# Patient Record
Sex: MALE | Race: WHITE | Employment: STUDENT | URBAN - NONMETROPOLITAN AREA
[De-identification: names, ages, dates, MRNs, and addresses within clinical notes are randomized per-mention and may not be internally consistent; named-entity substitution may affect disease eponyms.]

---

## 2024-04-16 ENCOUNTER — NURSE ONLY (OUTPATIENT)
Age: 24
End: 2024-04-16

## 2024-04-16 DIAGNOSIS — Z11.1 SCREENING FOR TUBERCULOSIS: Primary | ICD-10-CM

## 2024-04-16 PROCEDURE — 86580 TB INTRADERMAL TEST: CPT | Performed by: NURSE PRACTITIONER

## 2024-04-16 NOTE — PROGRESS NOTES
Patient presents in office today for number 1 TB screening. After obtaining consent from patient 0.1 mL of Tuberculin Purified, Diluted Aplisol was administered into to the left forearm by Mahsa Harding LPN. Patient tolerated well and was advised to return in 48-72 hours for reading. Patient voiced understanding.

## 2024-04-18 ENCOUNTER — OFFICE VISIT (OUTPATIENT)
Age: 24
End: 2024-04-18

## 2024-04-18 VITALS
BODY MASS INDEX: 20.4 KG/M2 | DIASTOLIC BLOOD PRESSURE: 70 MMHG | RESPIRATION RATE: 16 BRPM | HEIGHT: 67 IN | WEIGHT: 130 LBS | OXYGEN SATURATION: 99 % | HEART RATE: 83 BPM | SYSTOLIC BLOOD PRESSURE: 120 MMHG | TEMPERATURE: 97.2 F

## 2024-04-18 DIAGNOSIS — Z00.00 HEALTHY ADULT ON ROUTINE PHYSICAL EXAMINATION: Primary | ICD-10-CM

## 2024-04-18 LAB
INDURATION: 0
TB SKIN TEST: NEGATIVE

## 2024-04-18 PROCEDURE — 99202 OFFICE O/P NEW SF 15 MIN: CPT | Performed by: NURSE PRACTITIONER

## 2024-04-18 SDOH — ECONOMIC STABILITY: FOOD INSECURITY: WITHIN THE PAST 12 MONTHS, THE FOOD YOU BOUGHT JUST DIDN'T LAST AND YOU DIDN'T HAVE MONEY TO GET MORE.: NEVER TRUE

## 2024-04-18 SDOH — ECONOMIC STABILITY: INCOME INSECURITY: HOW HARD IS IT FOR YOU TO PAY FOR THE VERY BASICS LIKE FOOD, HOUSING, MEDICAL CARE, AND HEATING?: NOT HARD AT ALL

## 2024-04-18 SDOH — ECONOMIC STABILITY: HOUSING INSECURITY
IN THE LAST 12 MONTHS, WAS THERE A TIME WHEN YOU DID NOT HAVE A STEADY PLACE TO SLEEP OR SLEPT IN A SHELTER (INCLUDING NOW)?: NO

## 2024-04-18 SDOH — ECONOMIC STABILITY: FOOD INSECURITY: WITHIN THE PAST 12 MONTHS, YOU WORRIED THAT YOUR FOOD WOULD RUN OUT BEFORE YOU GOT MONEY TO BUY MORE.: NEVER TRUE

## 2024-04-18 ASSESSMENT — PATIENT HEALTH QUESTIONNAIRE - PHQ9
SUM OF ALL RESPONSES TO PHQ QUESTIONS 1-9: 0
2. FEELING DOWN, DEPRESSED OR HOPELESS: NOT AT ALL
SUM OF ALL RESPONSES TO PHQ QUESTIONS 1-9: 0
1. LITTLE INTEREST OR PLEASURE IN DOING THINGS: NOT AT ALL
SUM OF ALL RESPONSES TO PHQ QUESTIONS 1-9: 0
SUM OF ALL RESPONSES TO PHQ9 QUESTIONS 1 & 2: 0
SUM OF ALL RESPONSES TO PHQ QUESTIONS 1-9: 0

## 2024-04-18 ASSESSMENT — ENCOUNTER SYMPTOMS
SHORTNESS OF BREATH: 0
DIARRHEA: 0
SORE THROAT: 0
COUGH: 0
WHEEZING: 0
NAUSEA: 0
VOMITING: 0

## 2024-04-18 NOTE — PROGRESS NOTES
[facility] [place] [facility]  [facility] - [place] [address] [facility] [address]  Dept: [phone]  Loc: [phone]     Negro Maciel is a 23 y.o. male who presents today for:  No chief complaint on file.      Assessment/Plan:     1. Healthy adult on routine physical examination  -PE WNL.  F/u with health center PRN.       No results found for any visits on 04/18/24.     Medications Ordered:  No orders of the defined types were placed in this encounter.      No follow-ups on file.    No future appointments.    HPI:   Pt presents for pharmacy physical.  No current complaints.      Subjective:      Review of Systems   Constitutional:  Negative for chills and fever.   HENT:  Negative for congestion and sore throat.    Respiratory:  Negative for cough, shortness of breath and wheezing.    Cardiovascular:  Negative for chest pain and palpitations.   Gastrointestinal:  Negative for diarrhea, nausea and vomiting.   Skin:  Negative for rash.   Neurological:  Negative for dizziness, light-headedness and headaches.         Objective:     Vitals:    04/18/24 1031   BP: 120/70   Pulse: 83   Resp: 16   Temp: 97.2 °F (36.2 °C)   TempSrc: Tympanic   SpO2: 99%   Weight: 59 kg (130 lb)   Height: 1.702 m (5' 7\")       Body mass index is 20.36 kg/m².    Wt Readings from Last 3 Encounters:   04/18/24 59 kg (130 lb)     BP Readings from Last 3 Encounters:   04/18/24 120/70       Physical Exam  Vitals and nursing note reviewed.   Constitutional:       General: He is not in acute distress.     Appearance: Normal appearance. He is well-groomed and normal weight. He is not ill-appearing or toxic-appearing.   HENT:      Head: Normocephalic.      Right Ear: Hearing, tympanic membrane, ear canal and external ear normal.      Left Ear: Hearing, tympanic membrane, ear canal and external ear normal.      Nose: Nose normal.      Mouth/Throat:      Lips: Pink.      Mouth: Mucous membranes are moist.

## 2024-04-22 ENCOUNTER — NURSE ONLY (OUTPATIENT)
Age: 24
End: 2024-04-22

## 2024-04-22 DIAGNOSIS — Z11.1 ENCOUNTER FOR TUBERCULIN SKIN TEST: Primary | ICD-10-CM

## 2024-04-22 PROCEDURE — 86580 TB INTRADERMAL TEST: CPT | Performed by: NURSE PRACTITIONER

## 2024-04-22 NOTE — PROGRESS NOTES
Second step PPD into the right forearm patient tolerated well , patient advised in this visit to return in 48 - 72 hours for the reading.

## 2024-04-24 ENCOUNTER — NURSE ONLY (OUTPATIENT)
Age: 24
End: 2024-04-24

## 2024-04-24 LAB
INDURATION: NORMAL
TB SKIN TEST: NORMAL

## 2024-04-26 ENCOUNTER — OFFICE VISIT (OUTPATIENT)
Age: 24
End: 2024-04-26

## 2024-04-26 VITALS
BODY MASS INDEX: 20.72 KG/M2 | HEART RATE: 95 BPM | WEIGHT: 132 LBS | HEIGHT: 67 IN | SYSTOLIC BLOOD PRESSURE: 142 MMHG | DIASTOLIC BLOOD PRESSURE: 82 MMHG | RESPIRATION RATE: 16 BRPM | TEMPERATURE: 98.8 F | OXYGEN SATURATION: 98 %

## 2024-04-26 DIAGNOSIS — R03.0 ELEVATED BLOOD PRESSURE READING IN OFFICE WITHOUT DIAGNOSIS OF HYPERTENSION: ICD-10-CM

## 2024-04-26 DIAGNOSIS — L28.2 PRURITIC RASH: Primary | ICD-10-CM

## 2024-04-26 PROCEDURE — G8427 DOCREV CUR MEDS BY ELIG CLIN: HCPCS | Performed by: NURSE PRACTITIONER

## 2024-04-26 PROCEDURE — 99213 OFFICE O/P EST LOW 20 MIN: CPT | Performed by: NURSE PRACTITIONER

## 2024-04-26 PROCEDURE — G8420 CALC BMI NORM PARAMETERS: HCPCS | Performed by: NURSE PRACTITIONER

## 2024-04-26 PROCEDURE — 1036F TOBACCO NON-USER: CPT | Performed by: NURSE PRACTITIONER

## 2024-04-26 RX ORDER — PREDNISONE 20 MG/1
20 TABLET ORAL DAILY
Qty: 5 TABLET | Refills: 0 | Status: SHIPPED | OUTPATIENT
Start: 2024-04-26 | End: 2024-05-01

## 2024-04-26 ASSESSMENT — ENCOUNTER SYMPTOMS
SHORTNESS OF BREATH: 0
VOMITING: 0
DIARRHEA: 0
ABDOMINAL PAIN: 0
SORE THROAT: 0
CHEST TIGHTNESS: 0
WHEEZING: 0
RHINORRHEA: 0
COUGH: 0

## 2024-04-26 NOTE — PROGRESS NOTES
[facility] [place] [facility]  [facility] - [facility]  [address] [address]  Dept: [phone]  Loc: [phone]     Negro Maciel is a 23 y.o. male who presents today for:  Chief Complaint   Patient presents with    Rash     Noticed last night, was itching and this morning had spread from his right arm to the rest of his body. No other symptoms        Assessment/Plan:     1. Pruritic rash  -rash not consistent with scabies.  No drainage or signs of cellulitis.  Rash is blanchable.  No trouble breathing.  Appearance consistent with possible contact dermatitis from plant outdoors, however pt states he has not been outside a lot recently or around plants.  Will start on prednisone x5 days- if no improvement within 3-4 days, pt to f/u with health center to consider extended course.  Also advised oral antihistamine and OTC hydrocortisone cream.  Pt has not taken steroids recently.    -f/u ASAP with any new or worsening sxs    2. Elevated blood pressure reading in office without diagnosis of hypertension  -pt asymptomatic.  Will monitor       No results found for any visits on 04/26/24.     Medications Ordered:  Orders Placed This Encounter   Medications    predniSONE (DELTASONE) 20 MG tablet     Sig: Take 1 tablet by mouth daily for 5 days     Dispense:  5 tablet     Refill:  0       No follow-ups on file.    No future appointments.    HPI:   Pt presents with pruritic rash x less than 24 hours.  Rash is itchy, denies pain.  Denies fever, body aches, and chills.  No recent URI sxs.  Has not taken any medication for sxs.  Friend has a similar rash.  No recent camping or prolonged time spent outdoors or in woods.  No known exposure to poison ivy.  Itching does not increase at night.  No new food or medication.  No new laundry detergent, soap, lotion, cosmetics, etc.  Denies trouble breathing, wheezing, chest tightness, lip/throat/tongue swelling.    Rash  This is a new

## 2024-04-30 ENCOUNTER — TELEPHONE (OUTPATIENT)
Age: 24
End: 2024-04-30

## 2024-04-30 DIAGNOSIS — L28.2 PRURITIC RASH: Primary | ICD-10-CM

## 2024-04-30 RX ORDER — TRIAMCINOLONE ACETONIDE 0.25 MG/G
OINTMENT TOPICAL
Qty: 80 G | Refills: 0 | Status: SHIPPED | OUTPATIENT
Start: 2024-04-30 | End: 2024-05-07

## 2024-04-30 RX ORDER — PREDNISONE 10 MG/1
TABLET ORAL
Qty: 7 TABLET | Refills: 0 | Status: SHIPPED | OUTPATIENT
Start: 2024-04-30

## 2024-04-30 NOTE — TELEPHONE ENCOUNTER
Pt called [facility] stating rash from visit on 4/26/2024 has improved slightly but is still present.  Has taken 4 days of prednisone.  Also taking a daily Zyrtec.  Itching has improved.  Denies pain and fever.  No drainage from the rash, vesicles, or pustules.  Rash is not present on hands or fingers.    Discussed options with pt including extending course of steroids, referral to derm, or presenting to [facility] center for re-evaluation.  Decided we will extend steroid course and if sxs still not improving, will refer to dermatology.  Discussed steroid dosing with [place], pharmacist at [facility] who advised continuing prednisone 20 mg x5 days followed by 10 mg x5 days then 10 mg on day 7 and day 9.  Pt verbalized understanding of plan of care.